# Patient Record
Sex: MALE | Race: WHITE | NOT HISPANIC OR LATINO | Employment: FULL TIME | URBAN - METROPOLITAN AREA
[De-identification: names, ages, dates, MRNs, and addresses within clinical notes are randomized per-mention and may not be internally consistent; named-entity substitution may affect disease eponyms.]

---

## 2023-10-20 PROBLEM — R73.02 IGT (IMPAIRED GLUCOSE TOLERANCE): Status: ACTIVE | Noted: 2023-10-20

## 2023-10-22 ASSESSMENT — PROMIS GLOBAL HEALTH SCALE
CARRYOUT_PHYSICAL_ACTIVITIES: COMPLETELY
RATE_AVERAGE_PAIN: 0
RATE_MENTAL_HEALTH: EXCELLENT
RATE_GENERAL_HEALTH: EXCELLENT
RATE_SOCIAL_SATISFACTION: EXCELLENT
EMOTIONAL_PROBLEMS: NEVER
RATE_PHYSICAL_HEALTH: VERY GOOD
CARRYOUT_SOCIAL_ACTIVITIES: EXCELLENT
RATE_QUALITY_OF_LIFE: EXCELLENT

## 2023-10-23 ENCOUNTER — OFFICE VISIT (OUTPATIENT)
Dept: PRIMARY CARE | Facility: CLINIC | Age: 63
End: 2023-10-23
Payer: COMMERCIAL

## 2023-10-23 ENCOUNTER — LAB (OUTPATIENT)
Dept: LAB | Facility: LAB | Age: 63
End: 2023-10-23
Payer: COMMERCIAL

## 2023-10-23 VITALS
HEIGHT: 67 IN | DIASTOLIC BLOOD PRESSURE: 88 MMHG | SYSTOLIC BLOOD PRESSURE: 148 MMHG | HEART RATE: 64 BPM | WEIGHT: 173 LBS | OXYGEN SATURATION: 96 % | BODY MASS INDEX: 27.15 KG/M2 | TEMPERATURE: 97.3 F

## 2023-10-23 DIAGNOSIS — Z12.11 COLON CANCER SCREENING: ICD-10-CM

## 2023-10-23 DIAGNOSIS — R73.02 IGT (IMPAIRED GLUCOSE TOLERANCE): ICD-10-CM

## 2023-10-23 DIAGNOSIS — K63.5 POLYP OF COLON, UNSPECIFIED PART OF COLON, UNSPECIFIED TYPE: ICD-10-CM

## 2023-10-23 DIAGNOSIS — R97.20 ELEVATED PSA: Primary | ICD-10-CM

## 2023-10-23 DIAGNOSIS — E78.5 HYPERLIPIDEMIA, UNSPECIFIED HYPERLIPIDEMIA TYPE: ICD-10-CM

## 2023-10-23 DIAGNOSIS — Z00.00 ROUTINE ADULT HEALTH MAINTENANCE: ICD-10-CM

## 2023-10-23 DIAGNOSIS — Z00.00 ROUTINE ADULT HEALTH MAINTENANCE: Primary | ICD-10-CM

## 2023-10-23 DIAGNOSIS — R03.0 ELEVATED BP WITHOUT DIAGNOSIS OF HYPERTENSION: ICD-10-CM

## 2023-10-23 LAB
ALBUMIN SERPL BCP-MCNC: 4.6 G/DL (ref 3.4–5)
ALP SERPL-CCNC: 42 U/L (ref 33–136)
ALT SERPL W P-5'-P-CCNC: 14 U/L (ref 10–52)
ANION GAP SERPL CALC-SCNC: 15 MMOL/L (ref 10–20)
AST SERPL W P-5'-P-CCNC: 14 U/L (ref 9–39)
BILIRUB SERPL-MCNC: 1.1 MG/DL (ref 0–1.2)
BUN SERPL-MCNC: 22 MG/DL (ref 6–23)
CALCIUM SERPL-MCNC: 9.7 MG/DL (ref 8.6–10.6)
CHLORIDE SERPL-SCNC: 108 MMOL/L (ref 98–107)
CHOLEST SERPL-MCNC: 224 MG/DL (ref 0–199)
CHOLESTEROL/HDL RATIO: 4.3
CO2 SERPL-SCNC: 25 MMOL/L (ref 21–32)
CREAT SERPL-MCNC: 1.05 MG/DL (ref 0.5–1.3)
EST. AVERAGE GLUCOSE BLD GHB EST-MCNC: 111 MG/DL
GFR SERPL CREATININE-BSD FRML MDRD: 81 ML/MIN/1.73M*2
GLUCOSE SERPL-MCNC: 94 MG/DL (ref 74–99)
HBA1C MFR BLD: 5.5 %
HDLC SERPL-MCNC: 52.3 MG/DL
LDLC SERPL CALC-MCNC: 159 MG/DL
NON HDL CHOLESTEROL: 172 MG/DL (ref 0–149)
POTASSIUM SERPL-SCNC: 4.4 MMOL/L (ref 3.5–5.3)
PROT SERPL-MCNC: 7 G/DL (ref 6.4–8.2)
PSA SERPL-MCNC: 4.93 NG/ML
SODIUM SERPL-SCNC: 144 MMOL/L (ref 136–145)
TRIGL SERPL-MCNC: 66 MG/DL (ref 0–149)
VLDL: 13 MG/DL (ref 0–40)

## 2023-10-23 PROCEDURE — 83036 HEMOGLOBIN GLYCOSYLATED A1C: CPT

## 2023-10-23 PROCEDURE — 1036F TOBACCO NON-USER: CPT | Performed by: FAMILY MEDICINE

## 2023-10-23 PROCEDURE — 84153 ASSAY OF PSA TOTAL: CPT

## 2023-10-23 PROCEDURE — 36415 COLL VENOUS BLD VENIPUNCTURE: CPT

## 2023-10-23 PROCEDURE — 80061 LIPID PANEL: CPT

## 2023-10-23 PROCEDURE — 80053 COMPREHEN METABOLIC PANEL: CPT

## 2023-10-23 PROCEDURE — 99396 PREV VISIT EST AGE 40-64: CPT | Performed by: FAMILY MEDICINE

## 2023-10-23 ASSESSMENT — COLUMBIA-SUICIDE SEVERITY RATING SCALE - C-SSRS
6. HAVE YOU EVER DONE ANYTHING, STARTED TO DO ANYTHING, OR PREPARED TO DO ANYTHING TO END YOUR LIFE?: NO
1. IN THE PAST MONTH, HAVE YOU WISHED YOU WERE DEAD OR WISHED YOU COULD GO TO SLEEP AND NOT WAKE UP?: NO
2. HAVE YOU ACTUALLY HAD ANY THOUGHTS OF KILLING YOURSELF?: NO

## 2023-10-23 ASSESSMENT — LIFESTYLE VARIABLES
HOW MANY STANDARD DRINKS CONTAINING ALCOHOL DO YOU HAVE ON A TYPICAL DAY: 1 OR 2
AUDIT-C TOTAL SCORE: 4
SKIP TO QUESTIONS 9-10: 1
HOW OFTEN DO YOU HAVE SIX OR MORE DRINKS ON ONE OCCASION: NEVER
HOW OFTEN DO YOU HAVE A DRINK CONTAINING ALCOHOL: 4 OR MORE TIMES A WEEK

## 2023-10-23 ASSESSMENT — PATIENT HEALTH QUESTIONNAIRE - PHQ9
SUM OF ALL RESPONSES TO PHQ9 QUESTIONS 1 & 2: 0
2. FEELING DOWN, DEPRESSED OR HOPELESS: NOT AT ALL
1. LITTLE INTEREST OR PLEASURE IN DOING THINGS: NOT AT ALL

## 2023-10-23 NOTE — RESULT ENCOUNTER NOTE
Total and bad cholesterol are elevated putting him at elevated risk for heart disease.  Recommend low-fat low-cholesterol diet and regular exercise.  We will recheck these in 6 months however if still elevated he would be a candidate for cholesterol-lowering medication    His PSA is elevated just above 4 but will require evaluation with the urologist.  I will order referral.  Call [phone] to schedule

## 2023-10-23 NOTE — PROGRESS NOTES
"Here for PE    ROS :   Denies fevers chills sweats malaise fatigue  Denies headache dysarthria aphasia focal weakness  Denies neck pain stiffness swollen glands sore throat otalgia otorrhea facial pressure  Denies chest pain shortness of breath diaphoresis nausea palpitations syncope presyncope dyspnea on exertion orthopnea cough  Denies abdominal pain nausea vomiting heartburn constipation diarrhea stool changes melena hematochezia  Denies urinary frequency dysuria hematuria pyuria hesitancy dribbling nocturia hematuria   Denies scrotal pain testicular mass erectile dysfunction  Denies numbness tingling weakness ataxia loss of dexterity  Denies depression anxiety insomnia      PE:    /88   Pulse 64   Temp 36.3 °C (97.3 °F)   Ht 1.702 m (5' 7\")   Wt 78.5 kg (173 lb)   SpO2 96%   BMI 27.10 kg/m²      Appears comfortable  Not ill-appearing  Normocephalic atraumatic  Respirations normal  No retractions  Skin  without pallor petechia icterus cyanosis  Neck without JVD thyromegaly bruits adenopathy  Chest clear to auscultation without rales rhonchi wheeze  Heart regular rate and rhythm without murmur  Abdomen soft nondistended nontender without organomegaly or mass   testes descended without mass or hernia  Rectal without mass or heme  Prostate normal without enlargement nodularity bogginess asymmetry or mass  Extremities without erythema edema Homans or cord  Peripheral pulses palpable  Neuro cranial nerves II through XII intact   no sensory  motor loss or tremor  Gait normal   Affect normal  Does not appear anxious or depressed          "

## 2023-10-23 NOTE — PATIENT INSTRUCTIONS
Take all prescribed medications as directed.  Obtain labs if ordered.  Avoid added salt, canned food fast food.  Follow a diet rich in fruits and vegetables low-fat dairy and lean protein sources such as fish.  Limit red meat servings.  Limit dietary fat and cholesterol intake.  Less than 25% of your diet should be fat.  130 to 150 minutes of exercise weekly is recommended.  Weight loss is recommended.  Obtain Omron series 5 home blood pressure monitor available at [facility] or similar device.  Monitor blood pressure several times a week at different times.  Blood pressure goal should be less than 135/85  Follow-up in 1 month

## 2023-10-24 ENCOUNTER — TELEPHONE (OUTPATIENT)
Dept: PRIMARY CARE | Facility: CLINIC | Age: 63
End: 2023-10-24
Payer: COMMERCIAL

## 2023-10-24 NOTE — TELEPHONE ENCOUNTER
L/M for Ronak to call back to hear results:  Total and bad cholesterol are elevated putting him at elevated risk for heart disease.  Recommend low-fat low-cholesterol diet and regular exercise.  We will recheck these in 6 months however if still elevated he would be a candidate for cholesterol-lowering medication     His PSA is elevated just above 4 but will require evaluation with the urologist.  I will order referral.  Call [phone] to schedule

## 2023-10-24 NOTE — TELEPHONE ENCOUNTER
----- Message from Anthony Rockwell MD sent at 10/23/2023  5:15 PM EDT -----  Total and bad cholesterol are elevated putting him at elevated risk for heart disease.  Recommend low-fat low-cholesterol diet and regular exercise.  We will recheck these in 6 months however if still elevated he would be a candidate for cholesterol-  lowering medication    His PSA is elevated just above 4 but will require evaluation with the urologist.  I will order referral.  Call [phone] to schedule

## 2023-10-27 ENCOUNTER — OFFICE VISIT (OUTPATIENT)
Dept: UROLOGY | Facility: CLINIC | Age: 63
End: 2023-10-27
Payer: COMMERCIAL

## 2023-10-27 VITALS
BODY MASS INDEX: 27 KG/M2 | DIASTOLIC BLOOD PRESSURE: 78 MMHG | SYSTOLIC BLOOD PRESSURE: 133 MMHG | HEART RATE: 55 BPM | WEIGHT: 172 LBS | HEIGHT: 67 IN | TEMPERATURE: 97.9 F

## 2023-10-27 DIAGNOSIS — R97.20 ELEVATED PSA: Primary | ICD-10-CM

## 2023-10-27 PROCEDURE — 99204 OFFICE O/P NEW MOD 45 MIN: CPT | Performed by: STUDENT IN AN ORGANIZED HEALTH CARE EDUCATION/TRAINING PROGRAM

## 2023-10-27 PROCEDURE — 1036F TOBACCO NON-USER: CPT | Performed by: STUDENT IN AN ORGANIZED HEALTH CARE EDUCATION/TRAINING PROGRAM

## 2023-10-27 ASSESSMENT — ENCOUNTER SYMPTOMS
CARDIOVASCULAR NEGATIVE: 1
CONSTITUTIONAL NEGATIVE: 1
RESPIRATORY NEGATIVE: 1
GASTROINTESTINAL NEGATIVE: 1

## 2023-10-27 NOTE — LETTER
October 27, 2023     Anthony Rockwell MD  [address]  [address]  [facility] [address]    Patient: Ronak Gonzalez   YOB: 1960   Date of Visit: 10/27/2023       Dear Dr. Anthony Rockwell MD:    Thank you for referring Ronak Gonzalez to me for evaluation. Below are my notes for this consultation.  If you have questions, please do not hesitate to call me. I look forward to following your patient along with you.       Sincerely,     Brandyn Rios MD MPH      CC: No Recipients  ______________________________________________________________________________________    Subjective  Patient ID: Ronak Gonzalez is a 62 y.o. male who presents for Elevated PSA.  HPI    The patient is referred by Dr. Oviedo. The patient has a PSA of 4.93.  This is the first time being elevated.  No family history of prostate cancer.  Minimal LUTS with weak stream over the last few years.  Very active.    PMHx: None    PSHx: None    Family: No family history    Social: Never smoker    Review of Systems   Constitutional: Negative.    Respiratory: Negative.     Cardiovascular: Negative.    Gastrointestinal: Negative.    Genitourinary: Negative.    Skin: Negative.        Objective  Physical Exam  Constitutional:       Appearance: Normal appearance. He is normal weight.   Cardiovascular:      Rate and Rhythm: Normal rate and regular rhythm.   Pulmonary:      Effort: Pulmonary effort is normal.   Abdominal:      General: Abdomen is flat.      Palpations: Abdomen is soft.   Neurological:      Mental Status: He is alert.           Assessment/Plan  Problem List Items Addressed This Visit             ICD-10-CM    Elevated PSA R97.20     This is a 62 old man referred for an elevated PSA.  His PSA is 4.93.     We spoke at length regarding what PSA is, how it is used as a screening measure for prostate cancer.  We went into detail that PSA is only a screening test as there are other causes of elevated values such as BPH, inflammation, infection,  recent ejaculation.  Before more costly or aggressive interventions, a second PSA > 4 needs to be confirmed.  If there are two values >4  in time and space we could consider further evaluations including labs, imaging or procedures.  We discussed MRI imaging.  We discussed TRUS bx.  We discussed how MRIs may help make biopsies more reliable or may be able to help us avoid the biopsy in general.   We will get him set up for repeat PSA in the near future.  If >4 we will order a prostate MRI prior to follow-up    Thank you for allowing me to participate in the care of this patient.               PLAN:  Repeat PSA  If > 4 then prostate MRI

## 2023-10-27 NOTE — PROGRESS NOTES
Subjective   Patient ID: Ronak Gonzalez is a 62 y.o. male who presents for Elevated PSA.  HPI    The patient is referred by Dr. Oviedo. The patient has a PSA of 4.93.  This is the first time being elevated.  No family history of prostate cancer.  Minimal LUTS with weak stream over the last few years.  Very active.    PMHx: None    PSHx: None    Family: No family history    Social: Never smoker    Review of Systems   Constitutional: Negative.    Respiratory: Negative.     Cardiovascular: Negative.    Gastrointestinal: Negative.    Genitourinary: Negative.    Skin: Negative.        Objective   Physical Exam  Constitutional:       Appearance: Normal appearance. He is normal weight.   Cardiovascular:      Rate and Rhythm: Normal rate and regular rhythm.   Pulmonary:      Effort: Pulmonary effort is normal.   Abdominal:      General: Abdomen is flat.      Palpations: Abdomen is soft.   Neurological:      Mental Status: He is alert.           Assessment/Plan   Problem List Items Addressed This Visit             ICD-10-CM    Elevated PSA R97.20     This is a 62 old man referred for an elevated PSA.  His PSA is 4.93.     We spoke at length regarding what PSA is, how it is used as a screening measure for prostate cancer.  We went into detail that PSA is only a screening test as there are other causes of elevated values such as BPH, inflammation, infection, recent ejaculation.  Before more costly or aggressive interventions, a second PSA > 4 needs to be confirmed.  If there are two values >4  in time and space we could consider further evaluations including labs, imaging or procedures.  We discussed MRI imaging.  We discussed TRUS bx.  We discussed how MRIs may help make biopsies more reliable or may be able to help us avoid the biopsy in general.   We will get him set up for repeat PSA in the near future.  If >4 we will order a prostate MRI prior to follow-up    Thank you for allowing me to participate in the care of this  patient.               PLAN:  Repeat PSA  If > 4 then prostate MRI

## 2023-10-27 NOTE — ASSESSMENT & PLAN NOTE
This is a 62 old man referred for an elevated PSA.  His PSA is 4.93.     We spoke at length regarding what PSA is, how it is used as a screening measure for prostate cancer.  We went into detail that PSA is only a screening test as there are other causes of elevated values such as BPH, inflammation, infection, recent ejaculation.  Before more costly or aggressive interventions, a second PSA > 4 needs to be confirmed.  If there are two values >4  in time and space we could consider further evaluations including labs, imaging or procedures.  We discussed MRI imaging.  We discussed TRUS bx.  We discussed how MRIs may help make biopsies more reliable or may be able to help us avoid the biopsy in general.   We will get him set up for repeat PSA in the near future.  If >4 we will order a prostate MRI prior to follow-up    Thank you for allowing me to participate in the care of this patient.

## 2023-10-30 ENCOUNTER — LAB (OUTPATIENT)
Dept: LAB | Facility: LAB | Age: 63
End: 2023-10-30
Payer: COMMERCIAL

## 2023-10-30 DIAGNOSIS — R97.20 ELEVATED PSA: ICD-10-CM

## 2023-10-30 LAB — PSA SERPL-MCNC: 4.65 NG/ML

## 2023-10-30 PROCEDURE — 36415 COLL VENOUS BLD VENIPUNCTURE: CPT

## 2023-10-30 PROCEDURE — 84153 ASSAY OF PSA TOTAL: CPT

## 2023-12-04 ENCOUNTER — HOSPITAL ENCOUNTER (EMERGENCY)
Facility: HOSPITAL | Age: 63
Discharge: HOME | End: 2023-12-04
Attending: EMERGENCY MEDICINE
Payer: COMMERCIAL

## 2023-12-04 ENCOUNTER — OFFICE VISIT (OUTPATIENT)
Dept: ORTHOPEDIC SURGERY | Facility: HOSPITAL | Age: 63
End: 2023-12-04
Payer: COMMERCIAL

## 2023-12-04 ENCOUNTER — APPOINTMENT (OUTPATIENT)
Dept: RADIOLOGY | Facility: HOSPITAL | Age: 63
End: 2023-12-04
Payer: COMMERCIAL

## 2023-12-04 ENCOUNTER — HOSPITAL ENCOUNTER (OUTPATIENT)
Dept: RADIOLOGY | Facility: HOSPITAL | Age: 63
Discharge: HOME | End: 2023-12-04
Payer: COMMERCIAL

## 2023-12-04 VITALS — WEIGHT: 170 LBS | HEIGHT: 67 IN | BODY MASS INDEX: 26.68 KG/M2

## 2023-12-04 VITALS
TEMPERATURE: 97.5 F | SYSTOLIC BLOOD PRESSURE: 150 MMHG | BODY MASS INDEX: 26.68 KG/M2 | WEIGHT: 170 LBS | OXYGEN SATURATION: 98 % | HEIGHT: 67 IN | RESPIRATION RATE: 18 BRPM | DIASTOLIC BLOOD PRESSURE: 99 MMHG | HEART RATE: 55 BPM

## 2023-12-04 DIAGNOSIS — M79.89 LEG SWELLING: Primary | ICD-10-CM

## 2023-12-04 DIAGNOSIS — M17.11 PRIMARY OSTEOARTHRITIS OF RIGHT KNEE: Primary | ICD-10-CM

## 2023-12-04 DIAGNOSIS — M25.561 RIGHT KNEE PAIN, UNSPECIFIED CHRONICITY: ICD-10-CM

## 2023-12-04 DIAGNOSIS — R22.41 LOCALIZED SWELLING OF RIGHT LOWER LEG: ICD-10-CM

## 2023-12-04 LAB
ALBUMIN SERPL BCP-MCNC: 4.3 G/DL (ref 3.4–5)
ALP SERPL-CCNC: 43 U/L (ref 33–136)
ALT SERPL W P-5'-P-CCNC: 18 U/L (ref 10–52)
ANION GAP SERPL CALC-SCNC: 13 MMOL/L (ref 10–20)
APTT PPP: 31 SECONDS (ref 27–38)
AST SERPL W P-5'-P-CCNC: 17 U/L (ref 9–39)
BASOPHILS # BLD AUTO: 0.03 X10*3/UL (ref 0–0.1)
BASOPHILS NFR BLD AUTO: 0.5 %
BILIRUB SERPL-MCNC: 1.1 MG/DL (ref 0–1.2)
BUN SERPL-MCNC: 16 MG/DL (ref 6–23)
CALCIUM SERPL-MCNC: 9 MG/DL (ref 8.6–10.3)
CHLORIDE SERPL-SCNC: 102 MMOL/L (ref 98–107)
CO2 SERPL-SCNC: 27 MMOL/L (ref 21–32)
CREAT SERPL-MCNC: 0.83 MG/DL (ref 0.5–1.3)
EOSINOPHIL # BLD AUTO: 0.05 X10*3/UL (ref 0–0.7)
EOSINOPHIL NFR BLD AUTO: 0.9 %
ERYTHROCYTE [DISTWIDTH] IN BLOOD BY AUTOMATED COUNT: 13.9 % (ref 11.5–14.5)
GFR SERPL CREATININE-BSD FRML MDRD: >90 ML/MIN/1.73M*2
GLUCOSE SERPL-MCNC: 86 MG/DL (ref 74–99)
HCT VFR BLD AUTO: 44.4 % (ref 41–52)
HGB BLD-MCNC: 14.2 G/DL (ref 13.5–17.5)
IMM GRANULOCYTES # BLD AUTO: 0.02 X10*3/UL (ref 0–0.7)
IMM GRANULOCYTES NFR BLD AUTO: 0.4 % (ref 0–0.9)
INR PPP: 1 (ref 0.9–1.1)
LYMPHOCYTES # BLD AUTO: 2.16 X10*3/UL (ref 1.2–4.8)
LYMPHOCYTES NFR BLD AUTO: 39.5 %
MCH RBC QN AUTO: 31.9 PG (ref 26–34)
MCHC RBC AUTO-ENTMCNC: 32 G/DL (ref 32–36)
MCV RBC AUTO: 100 FL (ref 80–100)
MONOCYTES # BLD AUTO: 0.46 X10*3/UL (ref 0.1–1)
MONOCYTES NFR BLD AUTO: 8.4 %
NEUTROPHILS # BLD AUTO: 2.75 X10*3/UL (ref 1.2–7.7)
NEUTROPHILS NFR BLD AUTO: 50.3 %
NRBC BLD-RTO: 0 /100 WBCS (ref 0–0)
PLATELET # BLD AUTO: 205 X10*3/UL (ref 150–450)
POTASSIUM SERPL-SCNC: 4 MMOL/L (ref 3.5–5.3)
PROT SERPL-MCNC: 7.1 G/DL (ref 6.4–8.2)
PROTHROMBIN TIME: 11.3 SECONDS (ref 9.8–12.8)
RBC # BLD AUTO: 4.45 X10*6/UL (ref 4.5–5.9)
SODIUM SERPL-SCNC: 138 MMOL/L (ref 136–145)
WBC # BLD AUTO: 5.5 X10*3/UL (ref 4.4–11.3)

## 2023-12-04 PROCEDURE — 93971 EXTREMITY STUDY: CPT | Performed by: RADIOLOGY

## 2023-12-04 PROCEDURE — 73564 X-RAY EXAM KNEE 4 OR MORE: CPT | Mod: RIGHT SIDE | Performed by: RADIOLOGY

## 2023-12-04 PROCEDURE — 85025 COMPLETE CBC W/AUTO DIFF WBC: CPT | Performed by: PHYSICIAN ASSISTANT

## 2023-12-04 PROCEDURE — 85610 PROTHROMBIN TIME: CPT | Performed by: PHYSICIAN ASSISTANT

## 2023-12-04 PROCEDURE — 85730 THROMBOPLASTIN TIME PARTIAL: CPT | Performed by: PHYSICIAN ASSISTANT

## 2023-12-04 PROCEDURE — 93971 EXTREMITY STUDY: CPT

## 2023-12-04 PROCEDURE — 99214 OFFICE O/P EST MOD 30 MIN: CPT | Mod: 25 | Performed by: FAMILY MEDICINE

## 2023-12-04 PROCEDURE — 73564 X-RAY EXAM KNEE 4 OR MORE: CPT | Mod: RT

## 2023-12-04 PROCEDURE — 80053 COMPREHEN METABOLIC PANEL: CPT | Performed by: PHYSICIAN ASSISTANT

## 2023-12-04 PROCEDURE — 36415 COLL VENOUS BLD VENIPUNCTURE: CPT | Performed by: PHYSICIAN ASSISTANT

## 2023-12-04 PROCEDURE — 1036F TOBACCO NON-USER: CPT | Performed by: FAMILY MEDICINE

## 2023-12-04 PROCEDURE — 99204 OFFICE O/P NEW MOD 45 MIN: CPT | Performed by: FAMILY MEDICINE

## 2023-12-04 PROCEDURE — 99284 EMERGENCY DEPT VISIT MOD MDM: CPT | Performed by: EMERGENCY MEDICINE

## 2023-12-04 RX ORDER — MELOXICAM 15 MG/1
15 TABLET ORAL DAILY PRN
Qty: 30 TABLET | Refills: 0 | Status: SHIPPED | OUTPATIENT
Start: 2023-12-04 | End: 2024-01-04

## 2023-12-04 ASSESSMENT — PAIN SCALES - GENERAL
PAINLEVEL_OUTOF10: 7
PAINLEVEL_OUTOF10: 0 - NO PAIN

## 2023-12-04 ASSESSMENT — COLUMBIA-SUICIDE SEVERITY RATING SCALE - C-SSRS
2. HAVE YOU ACTUALLY HAD ANY THOUGHTS OF KILLING YOURSELF?: NO
6. HAVE YOU EVER DONE ANYTHING, STARTED TO DO ANYTHING, OR PREPARED TO DO ANYTHING TO END YOUR LIFE?: NO
1. IN THE PAST MONTH, HAVE YOU WISHED YOU WERE DEAD OR WISHED YOU COULD GO TO SLEEP AND NOT WAKE UP?: NO

## 2023-12-04 ASSESSMENT — PAIN - FUNCTIONAL ASSESSMENT: PAIN_FUNCTIONAL_ASSESSMENT: 0-10

## 2023-12-04 NOTE — ED PROVIDER NOTES
HPI   Chief Complaint   Patient presents with    Leg Pain       This is a 63-year-old male with no significant past medical history presenting with a chief complaint of lower extremity swelling.  He states he was seen his orthopedist office today for right knee pain when they became concerned about DVT.  He states the right knee has been bothering him for some months now and has been progressively worsening to the point where he is having difficulty ambulating.  He is not consistently taking over-the-counter pain medication for this.  He states within the last 4 to 5 days he has had lower extremity swelling.  He states the swelling is from the calf to the ankle and he has a clear delineation of the sock line which is never happened before.  He states the swelling tends to increase throughout the day when he is on his feet.  He also endorses pain along the calf body.  He denies fever, chills, rash.  No history of DVT.                          Kimberlee Coma Scale Score: 15                  Patient History   History reviewed. No pertinent past medical history.  Past Surgical History:   Procedure Laterality Date    OTHER SURGICAL HISTORY  07/19/2021    Colonoscopy complete for polypectomy     Family History   Problem Relation Name Age of Onset    No Known Problems Mother      No Known Problems Father       Social History     Tobacco Use    Smoking status: Never    Smokeless tobacco: Never   Substance Use Topics    Alcohol use: Not on file    Drug use: Not on file       Physical Exam   ED Triage Vitals [12/04/23 1529]   Temp Heart Rate Resp BP   36.4 °C (97.5 °F) 59 18 (!) 165/109      SpO2 Temp Source Heart Rate Source Patient Position   97 % Temporal Monitor --      BP Location FiO2 (%)     -- --       Physical Exam  Constitutional:       General: He is not in acute distress.  Cardiovascular:      Rate and Rhythm: Normal rate and regular rhythm.   Pulmonary:      Effort: Pulmonary effort is normal.   Musculoskeletal:          General: Tenderness present. No deformity. Normal range of motion.      Right lower leg: Edema present.      Comments: Mild right lower extremity edema compared to left.  Tenderness along the calf muscle body with positive Homans' sign.  No rashes noted.  The entire right calf appears to be slightly more red than the left however no discrete areas.  Nontender to the anterior calf.  Tenderness along the medial knee.  No crepitus along the right extremity.  Bilateral palpable DPs.   Neurological:      General: No focal deficit present.      Mental Status: He is alert and oriented to person, place, and time.   Psychiatric:         Mood and Affect: Mood normal.         Behavior: Behavior normal.         ED Course & MDM   Diagnoses as of 12/04/23 2037   Leg swelling                Medical Decision Making  This is a 63-year-old male with no significant past medical history presenting with a chief complaint of lower extremity swelling with concern for DVT in the setting of a recent plane trip to [place].  Patient is afebrile, vital signs are within normal limits.  No systemic symptoms at home.  Tenderness along the right calf muscle body with some mild swelling but he states it often looks much worse in the morning. DVT scan without evidence of DVT. Incidental finding of likely popliteal cyst.  CBC without leukocytosis concerning for infection.  No crepitus noted on physical exam. Low suspicion at this point for infection, this potentially could be associated with arthritis. Would recommend follow up with PCP in one week along with elevating legs, using compression socks and repeat duplex with persistent or worsening symptoms.     Seen and discussed with Dr. Perdue        Procedure  Procedures     Alison Rodriguez DO  Resident  12/04/23 2039

## 2023-12-04 NOTE — PROGRESS NOTES
** Please excuse any errors in grammar or translation related to this dictation. Voice recognition software was utilized to prepare this document. **    Assessment & Plan:  In regards to his right knee, exam is most consistent with acute exacerbation of underlying right knee arthritis.  Initial management of this can be completed with OTC analgesics such as ibuprofen or Tylenol, application of ice and heating pack, and scaling back activities as necessary.  A prescription for meloxicam will also be ordered for this acute pain.  If after these modalities he continues to have symptoms recommend follow-up in regular clinic visit for consideration of steroid injection.    More concerning for the patient is his acute unilateral right leg swelling.  Given recent plane travel back from [place] within the past month this places him at risk for an acute DVT.  Could also potentially be ruptured popliteal cyst given underlying knee OA.   Patient referred to emergency department for further evaluation.    All questions answered and patient agrees this plan of care.    Chief complaint:  Right knee pain and right leg swelling    HPI:  63-year-old male presents to Ortho injury clinic with right knee pain.  Reports has had intermittent knee pain for several months but acutely worsened 2 weeks ago.  Was walking in his yard when he felt a pop in the knee.  Pain is localized to the medial aspect of his knee and is made worse with weightbearing activities.  Took few doses of Tylenol but has not continued regularly.  Additionally notes acute swelling of his right lower extremity from below knee to the ankle.  This developed over the past 4 to 5 days.  He denies history of DVT.  Does endorse recent plane travel to and from [place] approximately 4 weeks ago.    Exam:  Right knee examined. No effusion, ecchymosis, or erythema.  AROM from 0 to 130 deg with 5/5 strength. SILT overlying knee. Motion crepitus present.  Focal tenderness  medial joint line.  No popliteal mass palpated. Negative anterior and posterior drawer.  No laxity to varus or valgus stress at 0 or 30 deg.  No patellar apprehension. - Clay    Right leg exam demonstrates calf swelling compared to left leg.  Tender to palpation of calf musculature.  Positive Homans' sign.    Results:  X-rays of right knee reviewed and independent interpreted as mild degenerative changes most prominent the medial compartment.

## 2023-12-04 NOTE — ED TRIAGE NOTES
Patient reports right lower leg swellings x 4-5  days .saw ortho today for knee pain which they attribute to arthritis but is concerned about the r calf and lower leg pain and swelling.  Denies cardiac hx, blood thinners, or previous DVTs.

## 2023-12-04 NOTE — ED TRIAGE NOTES
"As provider-in-triage, I performed a medical screening history and physical exam on this patient.    HISTORY OF PRESENT ILLNESS:  63-year-old male presents today with chief complaints of right calf pain and swelling from orthopedics office.  Orthopedic surgeon was concerned for potential DVT due to recent travel.  No history of DVTs and no history of anticoagulation.    Vital Signs reviewed:  Heart Rate:  [59]   Temp:  [36.4 °C (97.5 °F)]   Resp:  [18]   BP: (165)/(109)   Height:  [170.2 cm (5' 7\")]   Weight:  [77.1 kg (170 lb)]   SpO2:  [97 %]     BRIEF PHYSICAL EXAM:  No distress. Cardiac regular rate rhythm no murmur. Lungs clear bilaterally. Abdomen soft nontender.  Right calf pain and swelling on examination.     MDM:  Basic labs, coags, ultrasound of right lower extremity    I evaluated this patient in triage with the RN. Due to the patients complaint labs and or imaging were ordered by myself in an attempt to expedite patient care however I am not participating in care after evaluation. This is a preliminary assessment. Pt does not appear in acute distress at this time. They are stable and will have a full evaluation as soon as possible. They will be cared for by another provider who will possibly order more labs, imaging or interventions. Pt did not have a full ROS or PE completed by myself.         "

## 2023-12-15 ENCOUNTER — APPOINTMENT (OUTPATIENT)
Dept: UROLOGY | Facility: CLINIC | Age: 63
End: 2023-12-15
Payer: COMMERCIAL

## 2023-12-22 ENCOUNTER — HOSPITAL ENCOUNTER (OUTPATIENT)
Dept: RADIOLOGY | Facility: HOSPITAL | Age: 63
Discharge: HOME | End: 2023-12-22
Payer: COMMERCIAL

## 2023-12-22 DIAGNOSIS — R97.20 ELEVATED PSA: ICD-10-CM

## 2023-12-22 PROCEDURE — 72197 MRI PELVIS W/O & W/DYE: CPT

## 2023-12-22 PROCEDURE — 72197 MRI PELVIS W/O & W/DYE: CPT | Performed by: STUDENT IN AN ORGANIZED HEALTH CARE EDUCATION/TRAINING PROGRAM

## 2023-12-22 PROCEDURE — A9575 INJ GADOTERATE MEGLUMI 0.1ML: HCPCS | Performed by: STUDENT IN AN ORGANIZED HEALTH CARE EDUCATION/TRAINING PROGRAM

## 2023-12-22 PROCEDURE — 2550000001 HC RX 255 CONTRASTS: Performed by: STUDENT IN AN ORGANIZED HEALTH CARE EDUCATION/TRAINING PROGRAM

## 2023-12-22 RX ORDER — GADOTERATE MEGLUMINE 376.9 MG/ML
16 INJECTION INTRAVENOUS
Status: COMPLETED | OUTPATIENT
Start: 2023-12-22 | End: 2023-12-22

## 2023-12-22 RX ADMIN — GADOTERATE MEGLUMINE 16 ML: 376.9 INJECTION INTRAVENOUS at 08:15

## 2023-12-28 NOTE — PROGRESS NOTES
"Subjective   Patient ID: Christopher Gonzalez \"Shilpa" is a 63 y.o. male with elevated PSA who presents for 2 month FUV. His PSA is 4.65     Minimal LUTS with weak stream. This is not bothersome.     Prostate MRI 12/22/2023 showed a 77g gland, no PIRAD lesions and 0.06 PSA density.     PSA trends:  10/2023- 4.65  07/2021- 3.11      Past medical, surgical, family and social history were reviewed and unchanged since last visit besides what is in the HPI.                   Objective   Physical Exam   Gen: No acute distress     Psych: Alert and oriented x3     Neuro:  Normal ROM    Resp: Nonlabored respirations     CV: Regular rate and rhythm     Abd: S, NT, ND.     : Deferred    Skin: Warm, dry and intact without rashes     Lymphatics: No peripheral edema         Assessment/Plan   Problem List Items Addressed This Visit             ICD-10-CM       Genitourinary and Reproductive    Elevated PSA - Primary R97.20     I personally reviewed his imaging and we discussed this in detail. Prostate MRI 12/22/2023 showed a 77g gland, no PIRAD lesions and 0.06 PSA density.     We spoke at length regarding what PSA is, how it is used as a screening measure for prostate cancer.  We went into detail that PSA is only a screening test as there are other causes of elevated values such as BPH, inflammation, infection, recent ejaculation. Given his reassuring prostate MRI and PSA density, I would recommend that we do not move forward with a prostate biopsy at this time. I educated the patient on prostate size and concerning PSA trends. The patient will continue follow up with his PCP with regular PSA screening. If this continues to rise over time, I am happy to see the patient again.                PLAN:  He will continue PSA screening with his PCP, no need for biopsy now given negative MRI and PSAD < 0.15  Happy for him to be re-referred if PSA continues to rise  "

## 2023-12-29 ENCOUNTER — APPOINTMENT (OUTPATIENT)
Dept: UROLOGY | Facility: CLINIC | Age: 63
End: 2023-12-29
Payer: COMMERCIAL

## 2023-12-29 ENCOUNTER — OFFICE VISIT (OUTPATIENT)
Dept: UROLOGY | Facility: CLINIC | Age: 63
End: 2023-12-29
Payer: COMMERCIAL

## 2023-12-29 VITALS
BODY MASS INDEX: 29.98 KG/M2 | DIASTOLIC BLOOD PRESSURE: 92 MMHG | HEIGHT: 67 IN | WEIGHT: 191 LBS | HEART RATE: 53 BPM | SYSTOLIC BLOOD PRESSURE: 150 MMHG | TEMPERATURE: 96.5 F

## 2023-12-29 DIAGNOSIS — R97.20 ELEVATED PSA: Primary | ICD-10-CM

## 2023-12-29 PROCEDURE — 1036F TOBACCO NON-USER: CPT | Performed by: STUDENT IN AN ORGANIZED HEALTH CARE EDUCATION/TRAINING PROGRAM

## 2023-12-29 PROCEDURE — 99214 OFFICE O/P EST MOD 30 MIN: CPT | Performed by: STUDENT IN AN ORGANIZED HEALTH CARE EDUCATION/TRAINING PROGRAM

## 2023-12-29 NOTE — ASSESSMENT & PLAN NOTE
I personally reviewed his imaging and we discussed this in detail. Prostate MRI 12/22/2023 showed a 77g gland, no PIRAD lesions and 0.06 PSA density.     We spoke at length regarding what PSA is, how it is used as a screening measure for prostate cancer.  We went into detail that PSA is only a screening test as there are other causes of elevated values such as BPH, inflammation, infection, recent ejaculation. Given his reassuring prostate MRI and PSA density, I would recommend that we do not move forward with a prostate biopsy at this time. I educated the patient on prostate size and concerning PSA trends. The patient will continue follow up with his PCP with regular PSA screening. If this continues to rise over time, I am happy to see the patient again.

## 2023-12-29 NOTE — LETTER
"December 29, 2023     Anthony Rockwell MD  [address]  [address]  [facility] [address]    Patient: Ronak Gonzalez   YOB: 1960   Date of Visit: 12/29/2023       Dear Dr. Anthony Rockwell MD:    Thank you for referring Ronak Gonzalez to me for evaluation. Below are my notes for this consultation.  If you have questions, please do not hesitate to call me. I look forward to following your patient along with you.       Sincerely,     Brandyn Rios MD MPH      CC: No Recipients  ______________________________________________________________________________________    Subjective  Patient ID: Christopher Gonzalez \"Ronak\" is a 63 y.o. male with elevated PSA who presents for 2 month FUV. His PSA is 4.65     Minimal LUTS with weak stream. This is not bothersome.     Prostate MRI 12/22/2023 showed a 77g gland, no PIRAD lesions and 0.06 PSA density.     PSA trends:  10/2023- 4.65  07/2021- 3.11      Past medical, surgical, family and social history were reviewed and unchanged since last visit besides what is in the HPI.                   Objective  Physical Exam   Gen: No acute distress     Psych: Alert and oriented x3     Neuro:  Normal ROM    Resp: Nonlabored respirations     CV: Regular rate and rhythm     Abd: S, NT, ND.     : Deferred    Skin: Warm, dry and intact without rashes     Lymphatics: No peripheral edema         Assessment/Plan  Problem List Items Addressed This Visit             ICD-10-CM       Genitourinary and Reproductive    Elevated PSA - Primary R97.20     I personally reviewed his imaging and we discussed this in detail. Prostate MRI 12/22/2023 showed a 77g gland, no PIRAD lesions and 0.06 PSA density.     We spoke at length regarding what PSA is, how it is used as a screening measure for prostate cancer.  We went into detail that PSA is only a screening test as there are other causes of elevated values such as BPH, inflammation, infection, recent ejaculation. Given his reassuring prostate MRI " and PSA density, I would recommend that we do not move forward with a prostate biopsy at this time. I educated the patient on prostate size and concerning PSA trends. The patient will continue follow up with his PCP with regular PSA screening. If this continues to rise over time, I am happy to see the patient again.                PLAN:  He will continue PSA screening with his PCP, no need for biopsy now given negative MRI and PSAD < 0.15  Happy for him to be re-referred if PSA continues to rise

## 2024-01-04 ENCOUNTER — OFFICE VISIT (OUTPATIENT)
Dept: ORTHOPEDIC SURGERY | Facility: HOSPITAL | Age: 64
End: 2024-01-04
Payer: COMMERCIAL

## 2024-01-04 DIAGNOSIS — M17.11 PRIMARY OSTEOARTHRITIS OF RIGHT KNEE: Primary | ICD-10-CM

## 2024-01-04 DIAGNOSIS — M66.0 RUPTURE OF POPLITEAL CYST: ICD-10-CM

## 2024-01-04 PROCEDURE — 1036F TOBACCO NON-USER: CPT | Performed by: FAMILY MEDICINE

## 2024-01-04 PROCEDURE — 2500000005 HC RX 250 GENERAL PHARMACY W/O HCPCS: Performed by: FAMILY MEDICINE

## 2024-01-04 PROCEDURE — 99213 OFFICE O/P EST LOW 20 MIN: CPT | Performed by: FAMILY MEDICINE

## 2024-01-04 PROCEDURE — 2500000004 HC RX 250 GENERAL PHARMACY W/ HCPCS (ALT 636 FOR OP/ED): Performed by: FAMILY MEDICINE

## 2024-01-04 PROCEDURE — 20610 DRAIN/INJ JOINT/BURSA W/O US: CPT | Performed by: FAMILY MEDICINE

## 2024-01-04 RX ORDER — DEXTROMETHORPHAN HYDROBROMIDE, GUAIFENESIN 5; 100 MG/5ML; MG/5ML
650 LIQUID ORAL DAILY
COMMUNITY

## 2024-01-04 RX ORDER — TRIAMCINOLONE ACETONIDE 40 MG/ML
40 INJECTION, SUSPENSION INTRA-ARTICULAR; INTRAMUSCULAR
Status: COMPLETED | OUTPATIENT
Start: 2024-01-04 | End: 2024-01-04

## 2024-01-04 RX ORDER — LIDOCAINE HYDROCHLORIDE 10 MG/ML
4 INJECTION INFILTRATION; PERINEURAL
Status: COMPLETED | OUTPATIENT
Start: 2024-01-04 | End: 2024-01-04

## 2024-01-04 RX ADMIN — TRIAMCINOLONE ACETONIDE 40 MG: 200 INJECTION, SUSPENSION INTRA-ARTICULAR; INTRAMUSCULAR at 12:21

## 2024-01-04 RX ADMIN — LIDOCAINE HYDROCHLORIDE 4 ML: 10 INJECTION, SOLUTION INFILTRATION; PERINEURAL at 12:21

## 2024-01-04 NOTE — PROGRESS NOTES
** Please excuse any errors in grammar or translation related to this dictation. Voice recognition software was utilized to prepare this document. **    Assessment & Plan:  He is most likely suffering from exacerbation of patellofemoral osteoarthritis of the right knee.  He also has a popliteal cyst which likely ruptured that is contributing to some calf swelling.  No DVT on ultrasound from the ER on 12//4/2023.  Meloxicam did not offer relief and he is still having trouble walking, so we offered a corticosteroid injection today, which he tolerated well.  Additionally explained the importance of physical therapy and knee strengthening and rehab.  Follow-up as needed for pain.    All questions answered and patient agrees this plan of care.    Chief complaint:  Right knee pain and right leg swelling    HPI:    1/4/24:  Patient presents today with continued R knee pain that is located mostly on the inferior aspect of the patella.  He still notices swelling of his calf, but ultrasound of the right lower extremity did not reveal a DVT but it did reveal popliteal cyst.  The pain is worse with knee flexion activities, but is also present when walking.  This is very frustrating for him as he is a very active vel.  He tried meloxicam every day for 2 weeks, which did not help.  Now is taking Tylenol 650 mg and using capsaicin topical which brings his pain down from a 7 to a 4 out of 10.  No interval trauma.    12/4/23:  63-year-old male presents to Ortho injury clinic with right knee pain.  Reports has had intermittent knee pain for several months but acutely worsened 2 weeks ago.  Was walking in his yard when he felt a pop in the knee.  Pain is localized to the medial aspect of his knee and is made worse with weightbearing activities.  Took few doses of Tylenol but has not continued regularly.  Additionally notes acute swelling of his right lower extremity from below knee to the ankle.  This developed over the past 4 to 5  "days.  He denies history of DVT.  Does endorse recent plane travel to and from [place] approximately 4 weeks ago.    Exam:  Right knee examined. No effusion, ecchymosis, or erythema.  AROM from 0 to 130 deg with 5/5 strength. SILT overlying knee. Motion crepitus present.  Tenderness on the inferior portion of the patella.  Positive patellar grind.  No popliteal mass palpated. Negative anterior and posterior drawer.  No laxity to varus or valgus stress at 0 or 30 deg.  No patellar apprehension. - Vamo    Right leg exam demonstrates calf swelling compared to left leg. Calf is non-tender.    Patient ID: Christopher Gonzalez \"Shilpa" is a 63 y.o. male.    L Inj/Asp: R knee on 1/4/2024 12:21 PM  Indications: pain  Details: 25 G needle, anterolateral approach  Medications: 40 mg triamcinolone acetonide 40 mg/mL; 4 mL lidocaine 10 mg/mL (1 %)  Outcome: tolerated well, no immediate complications  Procedure, treatment alternatives, risks and benefits explained, specific risks discussed. Consent was given by the patient. Immediately prior to procedure a time out was called to verify the correct patient, procedure, equipment, support staff and site/side marked as required. Patient was prepped and draped in the usual sterile fashion.         "

## 2024-10-25 ENCOUNTER — APPOINTMENT (OUTPATIENT)
Dept: PRIMARY CARE | Facility: CLINIC | Age: 64
End: 2024-10-25
Payer: COMMERCIAL

## 2024-10-25 VITALS
SYSTOLIC BLOOD PRESSURE: 151 MMHG | HEART RATE: 56 BPM | OXYGEN SATURATION: 95 % | HEIGHT: 67 IN | BODY MASS INDEX: 28.72 KG/M2 | TEMPERATURE: 97.8 F | DIASTOLIC BLOOD PRESSURE: 84 MMHG | WEIGHT: 183 LBS

## 2024-10-25 DIAGNOSIS — Z00.00 ROUTINE ADULT HEALTH MAINTENANCE: Primary | ICD-10-CM

## 2024-10-25 DIAGNOSIS — I10 ESSENTIAL HYPERTENSION: ICD-10-CM

## 2024-10-25 DIAGNOSIS — N40.1 BENIGN PROSTATIC HYPERPLASIA WITH URINARY HESITANCY: ICD-10-CM

## 2024-10-25 DIAGNOSIS — R39.11 BENIGN PROSTATIC HYPERPLASIA WITH URINARY HESITANCY: ICD-10-CM

## 2024-10-25 DIAGNOSIS — Z12.11 COLON CANCER SCREENING: ICD-10-CM

## 2024-10-25 PROCEDURE — 3079F DIAST BP 80-89 MM HG: CPT | Performed by: FAMILY MEDICINE

## 2024-10-25 PROCEDURE — 1036F TOBACCO NON-USER: CPT | Performed by: FAMILY MEDICINE

## 2024-10-25 PROCEDURE — 3008F BODY MASS INDEX DOCD: CPT | Performed by: FAMILY MEDICINE

## 2024-10-25 PROCEDURE — 3077F SYST BP >= 140 MM HG: CPT | Performed by: FAMILY MEDICINE

## 2024-10-25 PROCEDURE — 99396 PREV VISIT EST AGE 40-64: CPT | Performed by: FAMILY MEDICINE

## 2024-10-25 RX ORDER — DOXAZOSIN 2 MG/1
2 TABLET ORAL NIGHTLY
Qty: 60 TABLET | Refills: 0 | Status: SHIPPED | OUTPATIENT
Start: 2024-10-25 | End: 2024-10-26

## 2024-10-25 RX ORDER — DOXAZOSIN 2 MG/1
2 TABLET ORAL NIGHTLY
Qty: 30 TABLET | Refills: 1 | Status: SHIPPED | OUTPATIENT
Start: 2024-10-25 | End: 2024-10-25 | Stop reason: WASHOUT

## 2024-10-25 ASSESSMENT — PATIENT HEALTH QUESTIONNAIRE - PHQ9
2. FEELING DOWN, DEPRESSED OR HOPELESS: NOT AT ALL
1. LITTLE INTEREST OR PLEASURE IN DOING THINGS: NOT AT ALL
SUM OF ALL RESPONSES TO PHQ9 QUESTIONS 1 & 2: 0

## 2024-10-25 NOTE — PROGRESS NOTES
"Here for PE  Had elevated PSA last year underwent evaluation with urology including MRI.  RI showed BPH changes in the transition zone.  Urology reviewed films saw patient biopsy not recommended    Had persistent hesitancy and slow weak urine stream without dysuria hematuria pyuria flank pain or significant persistent nocturia  ROS :   Denies fevers chills sweats malaise fatigue  Denies headache dysarthria aphasia focal weakness  Denies neck pain stiffness swollen glands sore throat otalgia otorrhea facial pressure  Denies chest pain shortness of breath diaphoresis nausea palpitations syncope presyncope dyspnea on exertion orthopnea cough  Denies abdominal pain nausea vomiting heartburn constipation diarrhea stool changes melena hematochezia   Denies scrotal pain testicular mass erectile dysfunction  Denies numbness tingling weakness ataxia loss of dexterity  Denies depression anxiety insomnia      PE:        /84   Pulse 56   Temp 36.6 °C (97.8 °F)   Ht 1.702 m (5' 7\")   Wt 83 kg (183 lb)   SpO2 95%   BMI 28.66 kg/m²     Appears comfortable  Not ill-appearing  Normocephalic atraumatic  Respirations normal  No retractions  Skin  without pallor petechia icterus cyanosis  Neck without JVD thyromegaly bruits adenopathy  Chest clear to auscultation without rales rhonchi wheeze  Heart regular rate and rhythm without murmur  Abdomen soft nondistended nontender without organomegaly or mass   testes descended without mass or hernia  Rectal without mass or heme  Prostate normal with mild enlargement nodularity bogginess asymmetry or mass  Extremities without erythema edema Homans or cord  Peripheral pulses palpable  Neuro cranial nerves II through XII intact   no sensory  motor loss or tremor  Gait normal   Affect normal  Does not appear anxious or depressed          "

## 2024-10-26 RX ORDER — DOXAZOSIN 2 MG/1
2 TABLET ORAL NIGHTLY
Qty: 60 TABLET | Refills: 0 | Status: SHIPPED | OUTPATIENT
Start: 2024-10-26 | End: 2025-04-24

## 2024-10-28 DIAGNOSIS — R39.11 BENIGN PROSTATIC HYPERPLASIA WITH URINARY HESITANCY: ICD-10-CM

## 2024-10-28 DIAGNOSIS — I10 ESSENTIAL HYPERTENSION: ICD-10-CM

## 2024-10-28 DIAGNOSIS — N40.1 BENIGN PROSTATIC HYPERPLASIA WITH URINARY HESITANCY: ICD-10-CM

## 2024-10-28 RX ORDER — DOXAZOSIN 2 MG/1
2 TABLET ORAL NIGHTLY
Qty: 60 TABLET | Refills: 0 | Status: SHIPPED | OUTPATIENT
Start: 2024-10-28 | End: 2024-12-27

## 2024-11-22 ENCOUNTER — ANESTHESIA EVENT (OUTPATIENT)
Dept: GASTROENTEROLOGY | Facility: HOSPITAL | Age: 64
End: 2024-11-22
Payer: COMMERCIAL

## 2024-11-22 ENCOUNTER — HOSPITAL ENCOUNTER (OUTPATIENT)
Dept: GASTROENTEROLOGY | Facility: HOSPITAL | Age: 64
Discharge: HOME | End: 2024-11-22
Payer: COMMERCIAL

## 2024-11-22 ENCOUNTER — ANESTHESIA (OUTPATIENT)
Dept: GASTROENTEROLOGY | Facility: HOSPITAL | Age: 64
End: 2024-11-22
Payer: COMMERCIAL

## 2024-11-22 VITALS
DIASTOLIC BLOOD PRESSURE: 67 MMHG | HEIGHT: 67 IN | SYSTOLIC BLOOD PRESSURE: 113 MMHG | WEIGHT: 182.98 LBS | BODY MASS INDEX: 28.72 KG/M2 | HEART RATE: 51 BPM | RESPIRATION RATE: 16 BRPM | OXYGEN SATURATION: 98 % | TEMPERATURE: 96.8 F

## 2024-11-22 DIAGNOSIS — Z12.11 COLON CANCER SCREENING: ICD-10-CM

## 2024-11-22 PROCEDURE — 3700000001 HC GENERAL ANESTHESIA TIME - INITIAL BASE CHARGE

## 2024-11-22 PROCEDURE — 3700000002 HC GENERAL ANESTHESIA TIME - EACH INCREMENTAL 1 MINUTE

## 2024-11-22 PROCEDURE — 7100000009 HC PHASE TWO TIME - INITIAL BASE CHARGE

## 2024-11-22 PROCEDURE — 2500000004 HC RX 250 GENERAL PHARMACY W/ HCPCS (ALT 636 FOR OP/ED): Performed by: ANESTHESIOLOGIST ASSISTANT

## 2024-11-22 PROCEDURE — 7100000010 HC PHASE TWO TIME - EACH INCREMENTAL 1 MINUTE

## 2024-11-22 PROCEDURE — 45378 DIAGNOSTIC COLONOSCOPY: CPT | Performed by: INTERNAL MEDICINE

## 2024-11-22 RX ORDER — PROPOFOL 10 MG/ML
INJECTION, EMULSION INTRAVENOUS CONTINUOUS PRN
Status: DISCONTINUED | OUTPATIENT
Start: 2024-11-22 | End: 2024-11-22

## 2024-11-22 SDOH — HEALTH STABILITY: MENTAL HEALTH: CURRENT SMOKER: 0

## 2024-11-22 ASSESSMENT — PAIN - FUNCTIONAL ASSESSMENT
PAIN_FUNCTIONAL_ASSESSMENT: 0-10
PAIN_FUNCTIONAL_ASSESSMENT: UNABLE TO SELF-REPORT

## 2024-11-22 ASSESSMENT — PAIN SCALES - GENERAL
PAINLEVEL_OUTOF10: 0 - NO PAIN
PAIN_LEVEL: 0
PAINLEVEL_OUTOF10: 0 - NO PAIN

## 2024-11-22 ASSESSMENT — COLUMBIA-SUICIDE SEVERITY RATING SCALE - C-SSRS
6. HAVE YOU EVER DONE ANYTHING, STARTED TO DO ANYTHING, OR PREPARED TO DO ANYTHING TO END YOUR LIFE?: NO
2. HAVE YOU ACTUALLY HAD ANY THOUGHTS OF KILLING YOURSELF?: NO
1. IN THE PAST MONTH, HAVE YOU WISHED YOU WERE DEAD OR WISHED YOU COULD GO TO SLEEP AND NOT WAKE UP?: NO

## 2024-11-22 NOTE — ANESTHESIA PREPROCEDURE EVALUATION
"Patient: Christopher Gonzalez \"Ronak\"    Procedure Information       Date/Time: 11/22/24 1000    Scheduled providers: Geoffrey Broderick MD    Procedure: COLONOSCOPY    Location: [facility]            Relevant Problems   Anesthesia (within normal limits)       Clinical information reviewed:    Allergies  Meds               NPO Detail:  NPO/Void Status  Date of Last Liquid: 11/22/24  Time of Last Liquid: 0630  Date of Last Solid: 11/20/24  Time of Last Solid: 1900         Physical Exam    Airway  Mallampati: I  TM distance: >3 FB  Neck ROM: full     Cardiovascular - normal exam     Dental - normal exam     Pulmonary - normal exam     Abdominal - normal exam             Anesthesia Plan    History of general anesthesia?: yes  History of complications of general anesthesia?: no    ASA 2     MAC     The patient is not a current smoker.  Patient was previously instructed to abstain from smoking on day of procedure.  Patient did not smoke on day of procedure.    intravenous induction   Anesthetic plan and risks discussed with patient.    Plan discussed with [facility]AA.      "

## 2024-11-22 NOTE — ANESTHESIA POSTPROCEDURE EVALUATION
"Patient: Christopher Gonzalez \"Ronak\"    Procedure Summary       Date: 11/22/24 Room / Location: [facility]    Anesthesia Start: 0911 Anesthesia Stop: 0935    Procedure: COLONOSCOPY Diagnosis: Colon cancer screening    Scheduled Providers: Geoffrey Broderick MD Responsible Provider: Marquez Cobb MD    Anesthesia Type: MAC ASA Status: 2            Anesthesia Type: MAC    Vitals Value Taken Time   /67 11/22/24 1000   Temp 36 °C (96.8 °F) 11/22/24 0934   Pulse 51 11/22/24 1005   Resp 16 11/22/24 1000   SpO2 98 % 11/22/24 1005       Anesthesia Post Evaluation    Patient location during evaluation: PACU  Patient participation: waiting for patient participation  Level of consciousness: sedated  Pain score: 0  Pain management: adequate  Airway patency: patent  Cardiovascular status: acceptable  Respiratory status: acceptable and nasal cannula  Hydration status: acceptable  Postoperative Nausea and Vomiting: none        No notable events documented.    "

## 2024-11-22 NOTE — H&P
Outpatient Hospital Procedure    Patient Profile-Procedures  Initial Info  Patient Demographics  Name Christopher Gonzalez  Date of Birth 1960  MRN [id number]  Address   [address] [place] [id number] [place] [address]    Primary Phone Number [phone]  Secondary Phone Number    Anthony Townsend    Procedures   Colonoscopy      Indication:  Surveillance     Primary contact name and number   Extended Emergency Contact Information  Primary Emergency Contact: Cyndee Gonzalez  Address: [address] [place] [address] [place]  Home Phone: [phone]  Mobile Phone: [phone]  Relation: Spouse    General Health  Weight   Vitals:    11/22/24 0851   Weight: 83 kg (182 lb 15.7 oz)     BMI Body mass index is 28.66 kg/m².    Allergies  No Known Allergies    Past Medical History   No past medical history on file.    Provider assessment  Diagnosis  Medication Reviewed - yes  Prior to Admission medications    Medication Sig Start Date End Date Taking? Authorizing Provider   doxazosin (Cardura) 2 mg tablet Take 1 tablet (2 mg) by mouth once daily at bedtime. 10/28/24 12/27/24 Yes Anthony Rockwell MD       This is my H&P    Physical Exam  Physical Exam  Constitutional:       Appearance: Normal appearance.   HENT:      Head: Normocephalic.      Mouth/Throat:      Mouth: Mucous membranes are moist.   Eyes:      Extraocular Movements: Extraocular movements intact.      Pupils: Pupils are equal, round, and reactive to light.   Cardiovascular:      Rate and Rhythm: Normal rate and regular rhythm.      Pulses: Normal pulses.      Heart sounds: Normal heart sounds.   Pulmonary:      Effort: Pulmonary effort is normal.      Breath sounds: Normal breath sounds.   Abdominal:      General: Bowel sounds are normal.      Palpations: Abdomen is soft.   Neurological:      Mental Status: He is alert.           ASA PS Classification 2  Sedation Plan Moderate  Procedure Plan -  pre-procedural (re)assesment completed by physician:  discharge/transfer patient when discharge criteria met    Geoffrey Broderick MD  11/22/2024 8:58 AM

## 2024-11-22 NOTE — SIGNIFICANT EVENT
\After visit summary reviewed with the patient, no further questions at this time. Pt ambulatory  out by staff with visitor

## 2024-12-26 DIAGNOSIS — N40.1 BENIGN PROSTATIC HYPERPLASIA WITH URINARY HESITANCY: ICD-10-CM

## 2024-12-26 DIAGNOSIS — R39.11 BENIGN PROSTATIC HYPERPLASIA WITH URINARY HESITANCY: ICD-10-CM

## 2024-12-26 DIAGNOSIS — I10 ESSENTIAL HYPERTENSION: ICD-10-CM

## 2024-12-27 RX ORDER — DOXAZOSIN 2 MG/1
2 TABLET ORAL NIGHTLY
Qty: 60 TABLET | Refills: 0 | Status: SHIPPED | OUTPATIENT
Start: 2024-12-27 | End: 2025-02-25

## 2025-03-01 DIAGNOSIS — I10 ESSENTIAL HYPERTENSION: ICD-10-CM

## 2025-03-01 DIAGNOSIS — N40.1 BENIGN PROSTATIC HYPERPLASIA WITH URINARY HESITANCY: ICD-10-CM

## 2025-03-01 DIAGNOSIS — R39.11 BENIGN PROSTATIC HYPERPLASIA WITH URINARY HESITANCY: ICD-10-CM

## 2025-03-03 RX ORDER — DOXAZOSIN 2 MG/1
2 TABLET ORAL NIGHTLY
Qty: 30 TABLET | Refills: 1 | Status: SHIPPED | OUTPATIENT
Start: 2025-03-03 | End: 2025-05-02

## 2025-04-27 DIAGNOSIS — N40.1 BENIGN PROSTATIC HYPERPLASIA WITH URINARY HESITANCY: ICD-10-CM

## 2025-04-27 DIAGNOSIS — R39.11 BENIGN PROSTATIC HYPERPLASIA WITH URINARY HESITANCY: ICD-10-CM

## 2025-04-27 DIAGNOSIS — I10 ESSENTIAL HYPERTENSION: ICD-10-CM

## 2025-04-28 RX ORDER — DOXAZOSIN 2 MG/1
2 TABLET ORAL NIGHTLY
Qty: 30 TABLET | Refills: 1 | Status: SHIPPED | OUTPATIENT
Start: 2025-04-28 | End: 2025-06-27

## 2025-07-03 DIAGNOSIS — N40.1 BENIGN PROSTATIC HYPERPLASIA WITH URINARY HESITANCY: ICD-10-CM

## 2025-07-03 DIAGNOSIS — R39.11 BENIGN PROSTATIC HYPERPLASIA WITH URINARY HESITANCY: ICD-10-CM

## 2025-07-03 DIAGNOSIS — I10 ESSENTIAL HYPERTENSION: ICD-10-CM

## 2025-07-03 RX ORDER — DOXAZOSIN 2 MG/1
2 TABLET ORAL NIGHTLY
Qty: 30 TABLET | Refills: 1 | Status: SHIPPED | OUTPATIENT
Start: 2025-07-03 | End: 2025-09-01

## 2025-08-31 DIAGNOSIS — I10 ESSENTIAL HYPERTENSION: ICD-10-CM

## 2025-08-31 DIAGNOSIS — R39.11 BENIGN PROSTATIC HYPERPLASIA WITH URINARY HESITANCY: ICD-10-CM

## 2025-08-31 DIAGNOSIS — N40.1 BENIGN PROSTATIC HYPERPLASIA WITH URINARY HESITANCY: ICD-10-CM

## 2025-09-02 RX ORDER — DOXAZOSIN 2 MG/1
2 TABLET ORAL NIGHTLY
Qty: 30 TABLET | Refills: 1 | Status: SHIPPED | OUTPATIENT
Start: 2025-09-02 | End: 2025-11-01

## 2025-10-06 ENCOUNTER — APPOINTMENT (OUTPATIENT)
Dept: PRIMARY CARE | Facility: CLINIC | Age: 65
End: 2025-10-06
Payer: COMMERCIAL